# Patient Record
Sex: FEMALE | Race: WHITE | URBAN - METROPOLITAN AREA
[De-identification: names, ages, dates, MRNs, and addresses within clinical notes are randomized per-mention and may not be internally consistent; named-entity substitution may affect disease eponyms.]

---

## 2022-05-18 ENCOUNTER — OFFICE VISIT (OUTPATIENT)
Dept: URBAN - METROPOLITAN AREA CLINIC 64 | Facility: CLINIC | Age: 67
End: 2022-05-18
Payer: COMMERCIAL

## 2022-05-18 DIAGNOSIS — H18.831 RECURRENT CORNEAL EROSION OF CORNEA, RIGHT EYE: Primary | ICD-10-CM

## 2022-05-18 PROCEDURE — 92004 COMPRE OPH EXAM NEW PT 1/>: CPT | Performed by: OPTOMETRIST

## 2022-05-18 ASSESSMENT — INTRAOCULAR PRESSURE
OD: 15
OS: 15

## 2022-05-18 ASSESSMENT — KERATOMETRY
OD: 44.59
OS: 44.83

## 2022-05-18 NOTE — IMPRESSION/PLAN
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 Plan: Healing recurrent erosion vs. early HSV OD. Pt. to use art tears q1h and gel qpm.  Call if symptoms are worsening. Otherwise symptoms should be back to normal in 1-2 days.

## 2022-10-07 ENCOUNTER — OFFICE VISIT (OUTPATIENT)
Dept: URBAN - METROPOLITAN AREA CLINIC 64 | Facility: CLINIC | Age: 67
End: 2022-10-07
Payer: COMMERCIAL

## 2022-10-07 DIAGNOSIS — H18.831 RECURRENT CORNEAL EROSION OF CORNEA, RIGHT EYE: Primary | ICD-10-CM

## 2022-10-07 DIAGNOSIS — E11.3293 TYPE 2 DIAB W MILD NONPRLF DIABETIC RTNOP W/O MACULAR EDEMA, BILATERAL: ICD-10-CM

## 2022-10-07 DIAGNOSIS — H35.371 PUCKERING OF MACULA, RIGHT EYE: ICD-10-CM

## 2022-10-07 PROCEDURE — 99213 OFFICE O/P EST LOW 20 MIN: CPT | Performed by: OPTOMETRIST

## 2022-10-07 PROCEDURE — 92134 CPTRZ OPH DX IMG PST SGM RTA: CPT | Performed by: OPTOMETRIST

## 2022-10-07 ASSESSMENT — INTRAOCULAR PRESSURE
OS: 21
OD: 20

## 2022-10-07 NOTE — IMPRESSION/PLAN
Impression: Puckering of macula, right eye: H35.371. Plan: Discussed. Mild ERM apparent on exam. Mac OCT done today. RTC 6 months for follow up with [facility].

## 2022-10-07 NOTE — IMPRESSION/PLAN
Impression: Type 2 diab w mild nonprlf diabetic rtnop w/o macular edema, bilateral: W71.9704. Plan: Mild Non-Proliferative Diabetic Retinopathy, no Diabetic Macular Edema and no Neovascularization of the iris, disc, or elsewhere. Discussed ocular and systemic benefits of blood sugar control. Check annually. Last A1c 5.7.

dot heme and cotton wool spot on exam today. RTC 6 months for follow up and [facility] imaging.

## 2022-10-07 NOTE — IMPRESSION/PLAN
Impression: Recurrent corneal erosion of cornea, right eye: E27.041. Plan: REcommend [facility] [address]  Also recomend AT [facility] [place] [facility].

## 2023-06-09 ENCOUNTER — OFFICE VISIT (OUTPATIENT)
Dept: URBAN - METROPOLITAN AREA CLINIC 24 | Facility: CLINIC | Age: 68
End: 2023-06-09
Payer: MEDICARE

## 2023-06-09 DIAGNOSIS — H35.362 DRUSEN (DEGENERATIVE) OF MACULA, LEFT EYE: ICD-10-CM

## 2023-06-09 DIAGNOSIS — H16.223 KERATOCONJUNCTIVITIS SICCA, BILATERAL: ICD-10-CM

## 2023-06-09 DIAGNOSIS — E11.9 DIABETES MELLITUS TYPE 2 WITHOUT MENTION OF COMPLICATION: Primary | ICD-10-CM

## 2023-06-09 PROCEDURE — 92014 COMPRE OPH EXAM EST PT 1/>: CPT | Performed by: STUDENT IN AN ORGANIZED HEALTH CARE EDUCATION/TRAINING PROGRAM

## 2023-06-09 PROCEDURE — 92134 CPTRZ OPH DX IMG PST SGM RTA: CPT | Performed by: STUDENT IN AN ORGANIZED HEALTH CARE EDUCATION/TRAINING PROGRAM

## 2023-06-09 ASSESSMENT — VISUAL ACUITY
OD: 20/20
OS: 20/20

## 2023-06-09 ASSESSMENT — INTRAOCULAR PRESSURE
OS: 16
OD: 17

## 2023-06-09 NOTE — IMPRESSION/PLAN
Impression: Keratoconjunctivitis sicca, bilateral: Y98.834. Plan: Dry eyes cont to contribute to the patient's complaints. Cont artificial tears qid prn OU.

## 2023-06-09 NOTE — IMPRESSION/PLAN
Impression: Drusen (degenerative) of macula, left eye: H35.362. Plan: Along arcades and periphery. +FHX: mother had wet AMD
no hx of smoking Pt ed importance of uv protection and not smoking

## 2023-06-09 NOTE — IMPRESSION/PLAN
Impression: Diabetes mellitus Type 2 without mention of complication: R77.2.
-- diet controlled Plan: Pt ed no signs diabetic retinopathy. Discuss benefits of steady blood glucose control and follow up care with PCP. Patient was instructed to monitor vision for sudden changes and to call if visual changes noted.  RTC for annual diabetic eye exam

## 2024-11-13 ENCOUNTER — OFFICE VISIT (OUTPATIENT)
Dept: URBAN - METROPOLITAN AREA CLINIC 24 | Facility: CLINIC | Age: 69
End: 2024-11-13
Payer: MEDICARE

## 2024-11-13 DIAGNOSIS — H16.143 PUNCTATE KERATITIS, BILATERAL: ICD-10-CM

## 2024-11-13 DIAGNOSIS — H25.13 AGE-RELATED NUCLEAR CATARACT, BILATERAL: ICD-10-CM

## 2024-11-13 DIAGNOSIS — E11.9 DIABETES MELLITUS TYPE 2 WITHOUT MENTION OF COMPLICATION: Primary | ICD-10-CM

## 2024-11-13 PROCEDURE — 92014 COMPRE OPH EXAM EST PT 1/>: CPT | Performed by: STUDENT IN AN ORGANIZED HEALTH CARE EDUCATION/TRAINING PROGRAM

## 2024-11-13 ASSESSMENT — INTRAOCULAR PRESSURE
OS: 17
OD: 17